# Patient Record
Sex: FEMALE | Race: WHITE
[De-identification: names, ages, dates, MRNs, and addresses within clinical notes are randomized per-mention and may not be internally consistent; named-entity substitution may affect disease eponyms.]

---

## 2019-10-02 ENCOUNTER — HOSPITAL ENCOUNTER (INPATIENT)
Dept: HOSPITAL 41 - JD.MS | Age: 49
LOS: 35 days | Discharge: HOME | DRG: 519 | End: 2019-11-06
Attending: OBSTETRICS & GYNECOLOGY | Admitting: OBSTETRICS & GYNECOLOGY
Payer: COMMERCIAL

## 2019-10-02 DIAGNOSIS — E11.9: ICD-10-CM

## 2019-10-02 DIAGNOSIS — Z90.49: ICD-10-CM

## 2019-10-02 DIAGNOSIS — F17.210: ICD-10-CM

## 2019-10-02 DIAGNOSIS — Z88.0: ICD-10-CM

## 2019-10-02 DIAGNOSIS — D25.9: Primary | ICD-10-CM

## 2019-10-02 DIAGNOSIS — I10: ICD-10-CM

## 2019-10-02 DIAGNOSIS — D64.9: ICD-10-CM

## 2019-11-06 PROCEDURE — 0UT97ZZ RESECTION OF UTERUS, VIA NATURAL OR ARTIFICIAL OPENING: ICD-10-PCS | Performed by: OBSTETRICS & GYNECOLOGY

## 2019-11-06 RX ADMIN — LIDOCAINE HYDROCHLORIDE AND EPINEPHRINE ONE ML: 10; 10 INJECTION, SOLUTION INFILTRATION; PERINEURAL at 09:06

## 2019-11-06 RX ADMIN — LIDOCAINE HYDROCHLORIDE AND EPINEPHRINE ONE ML: 10; 10 INJECTION, SOLUTION INFILTRATION; PERINEURAL at 08:51

## 2019-11-06 NOTE — PCM.OPNOTE
- General Post-Op/Procedure Note


Date of Surgery/Procedure: 11/06/19


Operative Procedure(s): Excision of cervical fibroid.  Total vaginal 

hysterectomy


Findings: 


SVE/SSE with large fibroid noted which at this time is prolapsed through the 

cervix.  Is about 6 cm in diameter and feels to be a narrow stalk extending up 

into the cervix.  Once removed remainder of uterus feels normal.  Cervix has an 

almost parous appearance due to distension from fibroid.  Once uterus removed 

only able to slightly visualize ovaries, but grossly normal.   


Pre Op Diagnosis: Menorrhagia.  Cervical fibroid


Post-Op Diagnosis: Same


Anesthesia Technique: General ET Tube


Primary Surgeon: Christa Snowden


Secondary Surgeon: Medina Vivar


Anesthesia Provider: Antoinette Weston


Reason Assistant Was Necessary: 


BMI of patient, speed/safety of procedure


Pathology: 


Cervix and fibroid sent to pathology for further evaluation 


Fluid Replacement, Intraop: 1,800


Output, Urine Amount: 175


EBL in mLs: 200


Complications: None


Condition: Good


Free Text/Narrative:: 





The risks, benefits, indications, potential complications, and alternatives 

were explained to the patient and informed consent obtained.





The patient was taken to the Operating Room where general anesthesia was 

induced without complication and found to be adequate. The patient was placed 

in dorsal lithotomy with julian stirrups and an exam under anesthesia revealed 

the findings detailed above. The patient was then prepped and draped in the 

usual sterile fashion. Floley catheter was placed into the bladder.. 





A weighted speculum was placed in the vagina and the fibroid was grasped with a 

tenaculum.  An EndoLoop was fed around cervix and tightened at the stalk.  

Scalpel then used to excise fibroid in several large pieces. After this portion 

of procedure cervix able to be seen well and appeared normal other than 

slightly distended.  The cervix was injected circumferentially with dilute 1% 

lidocaine with epinephrine. The cervix was then circumferentially incised with 

a scalpel.  The posterior cul-de-sac was entered sharply. The short weighted 

speculum was replaced with a long weighted speculum into the peritoneal cavity 

posteriorly.  The bladder was dissected away from the pubovesical cervical 

fascia anteriorly with a sponge and blunt dissection.  The uterosacral 

ligaments were grasped on either side with the Ligasure, cauterized, and 

transected. Hemostasis was assured. A raytec was used for further blunt 

dissection of the bladder away from the pubovesical cervical fascia and then 

the anterior cul de sac was entered sharply with Metzenbaum scissors.  The 

cardinal ligaments were then serially clamped on both sides with the Ligasure, 

cauterized, and transected. The uterine arteries were then clamped with the 

Ligasure, cauterized, and transected. Hemostasis was adequate.  Both cornua 

were then clamped across with the Ligasure, cauterized, and transected.  

Specimen removed.  Hemostasis noted of pedicles.  





The posterior peritoneum was closed with a running, locked 0 vicryl suture.  

Next Jarrod seal placed across pedicles due to slight abraided appearance of cuff 

at lateral apexes.  The vaginal cuff was then closed in a running locked 

fashion with 0-Vicryl. Hemostasis was noted. 





All sponge, lap, needle, and instrument counts were correct x 2. The patient 

tolerated the procedure well and there were no complications.

## 2019-11-06 NOTE — PCM.POSTAN
POST ANESTHESIA ASSESSMENT





- MENTAL STATUS


Mental Status: Alert, Oriented





- VITAL SIGNS


Vital Signs: 


 Last Vital Signs











Temp  36.8 C   11/06/19 07:15


 


Pulse  85   11/06/19 07:15


 


Resp  16   11/06/19 07:15


 


BP  150/89 H  11/06/19 07:15


 


Pulse Ox  96   11/06/19 07:36














- RESPIRATORY


Respiratory Status: Respiratory Rate WNL, Airway Patent, O2 Saturation Stable, 

Supplemental Oxygen





- CARDIOVASCULAR


CV Status: Pulse Rate WNL, Blood Pressure Stable





- GASTROINTESTINAL


GI Status: No Symptoms





- PAIN


Pain Score: 0





- POST OP HYDRATION


Hydration Status: Adequate & Stable

## 2019-11-06 NOTE — [id number]
Post Anesthesia Note





- EVALUATION WITHIN 48HRS OF ANESTHETIC


Vital Signs in Normal Range: Yes


Patient Participated in Evaluation: Yes


Respiratory Function Stable: Yes


Airway Patent: Yes


Cardiovascular Function Stable: Yes


Hydration Status Stable: Yes


Pain Control Satisfactory: Yes


Nausea and Vomiting Control Satisfactory: Yes


Mental Status Recovered: Yes


Vital Signs: 


 Last Vital Signs











Temp  36.7 C   11/06/19 12:30


 


Pulse  61   11/06/19 12:30


 


Resp  14   11/06/19 12:30


 


BP  113/64   11/06/19 12:30


 


Pulse Ox  100   11/06/19 12:30

## 2019-11-06 NOTE — PCM.PREANE
Preanesthetic Assessment





- Anesthesia/Transfusion/Family Hx


Anesthesia History: Prior Anesthesia Without Reaction


Family History of Anesthesia Reaction: No


Transfusion History: No Prior Transfusion(s)





- Review of Systems


General: No Symptoms


Pulmonary: No Symptoms (Smoker, just under a pack a day. )


Cardiovascular: No Symptoms


Gastrointestinal: No Symptoms


Neurological: No Symptoms


Other: Reports: Diabetes (Type II, Blood glucose 200. )





- Physical Assessment


NPO Status Date: 19


NPO Status Time: 06:30 (Water)


Vital Signs: 





 Last Vital Signs











Temp  36.8 C   19 07:15


 


Pulse  85   19 07:15


 


Resp  16   19 07:15


 


BP  150/89 H  19 07:15


 


Pulse Ox  96   19 07:36











Height: 1.7 m


Weight: 106.594 kg


ASA Class: 2


Mental Status: Alert & Oriented x3


Airway Class: Mallampati = 1


Dentition: Reports: Normal Dentition


Thyro-Mental Finger Breadths: 3


Mouth Opening Finger Breadths: 3


ROM/Head Extension: Full


Lungs: Clear to Auscultation, Normal Respiratory Effort


Cardiovascular: Regular Rate, Regular Rhythm





- Lab


Values: 





 Laboratory Last Values











WBC  7.29 K/mm3 (3.98-10.04)   19  07:27    


 


RBC  4.76 M/mm3 (3.98-5.22)   19  07:27    


 


Hgb  12.0 gm/dl (11.2-15.7)   19  07:27    


 


Hct  37.3 % (34.1-44.9)   19  07:27    


 


MCV  78.4 fl (79.4-94.8)  L  19  07:27    


 


MCH  25.2 pg (25.6-32.2)  L  19  07:27    


 


MCHC  32.2 g/dl (32.2-35.5)   19  07:27    


 


RDW Std Deviation  55.3 fL (36.4-46.3)  H  19  07:27    


 


Plt Count  326 K/mm3 (182-369)   19  07:27    


 


MPV  9.1 fl (9.4-12.3)  L  19  07:27    


 


Neut % (Auto)  61.9 % (34.0-71.1)   19  07:27    


 


Lymph % (Auto)  23.5 % (19.3-51.7)   19  07:27    


 


Mono % (Auto)  9.1 % (4.7-12.5)   19  07:27    


 


Eos % (Auto)  4.1  (0.7-5.8)   19  07:27    


 


Baso % (Auto)  1.0 % (0.1-1.2)   19  07:27    


 


Neut # (Auto)  4.52 K/mm3 (1.56-6.13)   19  07:27    


 


Lymph # (Auto)  1.71 K/mm3 (1.18-3.74)   19  07:27    


 


Mono # (Auto)  0.66 K/mm3 (0.24-0.36)  H  19  07:27    


 


Eos # (Auto)  0.30 K/mm3 (0.04-0.36)   19  07:27    


 


Baso # (Auto)  0.07 K/mm3 (0.01-0.08)   19  07:27    


 


Sodium  139 mEq/L (136-145)   19  07:27    


 


Potassium  4.3 mEq/L (3.5-5.1)   19  07:27    


 


Chloride  104 mEq/L ()   19  07:27    


 


Carbon Dioxide  24 mEq/L (21-32)   19  07:27    


 


Anion Gap  15.3  (5-15)  H  19  07:27    


 


BUN  13 mg/dL (7-18)   19  07:27    


 


Creatinine  0.9 mg/dL (0.55-1.02)   19  07:27    


 


Est Cr Clr Drug Dosing  74.34 mL/min  19  07:27    


 


Estimated GFR (MDRD)  > 60 mL/min (>60)   19  07:27    


 


BUN/Creatinine Ratio  14.4  (14-18)   19  07:27    


 


Glucose  197 mg/dL ()  H  19  07:27    


 


POC Glucose  200 mg/dL ()  H  19  07:27    


 


Calcium  9.1 mg/dL (8.5-10.1)   19  07:27    


 


Urine HCG, Qual  Negative  (NEGATIVE)   19  07:12    














- Allergies


Allergies/Adverse Reactions: 


 Allergies











Allergy/AdvReac Type Severity Reaction Status Date / Time


 


Penicillins Allergy  Anaphylactic Verified 19 07:59





   Shock  














- Anesthesia Plan


Pre-Op Medication Ordered: Anxiolytic





- Acknowledgements


Anesthesia Type Planned: General Anesthesia


Pt an Appropriate Candidate for the Planned Anesthesia: Yes


Alternatives and Risks of Anesthesia Discussed w Pt/Guardian: Yes


Pt/Guardian Understands and Agrees with Anesthesia Plan: Yes





PreAnesthesia Questionnaire


HEENT History: Reports: Impaired Vision, Other (See Below)


Other HEENT History: WEARS GLASSES


Cardiovascular History: Reports: None


Respiratory History: Reports: None


Gastrointestinal History: Reports: None


Genitourinary History: Reports: STD


OB/GYN History: Reports: Other (See Below)


Other OB/BYN History: CERVICAL FIBROID, HOT FLASHES, GENITAL WARTS, TERMINATED 




Musculoskeletal History: Reports: None


Neurological History: Reports: None


Psychiatric History: Reports: None


Endocrine/Metabolic History: Reports: None


Hematologic History: Reports: Anemia


Immunologic History: Reports: None


Oncologic (Cancer) History: Reports: None


Dermatologic History: Reports: None





- Past Surgical History


Head Surgeries/Procedures: Reports: None


Cardiovascular Surgical History: Reports: None


Respiratory Surgical History: Reports: None


GI Surgical History: Reports: Cholecystectomy


Endocrine Surgical History: Reports: None


Neurological Surgical History: Reports: None


Musculoskeletal Surgical History: Reports: None


Oncologic Surgical History: Reports: None


Dermatological Surgical History: Reports: None





- SUBSTANCE USE


Smoking Status *Q: Current Every Day Smoker


Recreational Drug Use History: No





- HOME MEDS


Home Medications: 


 Home Meds





Ferrous Sulfate [Iron] 325 mg PO DAILY 19 [History]


Ibuprofen [Advil Liqui-Gels] 200 - 400 mg PO BID PRN 19 [History]


metFORMIN [Glucophage] 500 mg PO BID 19 [History]











- CURRENT (IN HOUSE) MEDS


Current Meds: 





 Current Medications





Albuterol (Proventil Neb Soln)  2.5 mg NEB ONETIME TANK


   Last Admin: 19 07:53 Dose:  2.5 mg


Lactated Ringer's (Ringers, Lactated)  1,000 mls @ 125 mls/hr IV ASDIRECTED TANK


   Stop: 19 23:00


   Last Admin: 19 07:27 Dose:  125 mls/hr


Lidocaine/Sodium Bicarbonate (Buffered Lidocaine 1% In Ns 8.4%)  0.25 ml IDERM 

ONETIME PRN


   PRN Reason: Prior to IV Start


   Stop: 19 18:00


   Last Admin: 19 07:27 Dose:  0.25 ml


Sodium Chloride (Saline Flush)  10 ml FLUSH ASDIRECTED PRN


   PRN Reason: Keep Vein Open


   Stop: 19 18:00





Discontinued Medications





Bupivacaine HCl (Marcaine 0.5%) Confirm Administered Dose 30 ml .ROUTE .STK-MED 

ONE


   Stop: 19 07:26


Cefazolin Sodium (Ancef) Confirm Administered Dose 2 gm .ROUTE .STK-MED ONE


   Stop: 19 07:27


Dexamethasone (Dexamethasone) Confirm Administered Dose 20 mg .ROUTE .STK-MED 

ONE


   Stop: 19 07:28


Fentanyl (Sublimaze) Confirm Administered Dose 250 mcg .ROUTE .STK-MED ONE


   Stop: 19 07:27


Lidocaine HCl (Xylocaine-Mpf 1%) Confirm Administered Dose 4 mls @ as directed 

.ROUTE .STK-MED ONE


   Stop: 19 07:27


Lactated Ringer's (Ringers, Lactated) Confirm Administered Dose 1,000 mls @ as 

directed .ROUTE .STK-MED ONE


   Stop: 19 07:27


Ketorolac Tromethamine (Toradol) Confirm Administered Dose 30 mg .ROUTE .STK-

MED ONE


   Stop: 19 07:28


Lidocaine/Epinephrine (Xylocaine 1% With Epinephrine 1:100,000) Confirm 

Administered Dose 20 ml .ROUTE .STK-MED ONE


   Stop: 19 07:26


Midazolam HCl (Versed 1 Mg/Ml) Confirm Administered Dose 2 mg .ROUTE .STK-MED 

ONE


   Stop: 19 07:27


Ondansetron HCl (Zofran) Confirm Administered Dose 4 mg .ROUTE .STK-MED ONE


   Stop: 19 07:27


Propofol (Diprivan  20 Ml) Confirm Administered Dose 400 mg .ROUTE .STK-MED ONE


   Stop: 19 07:27


Rocuronium Bromide (Zemuron) Confirm Administered Dose 50 mg .ROUTE .STK-MED ONE


   Stop: 19 07:27